# Patient Record
(demographics unavailable — no encounter records)

---

## 2025-01-24 NOTE — PLAN
[FreeTextEntry1] : PCB    - US ordered   Vaginal discharge   -Affirm culture obtained  and metrogel sent   F/U in 1 month for annual examination  All questions and concerns addressed during encounter. Pt. agreed to plan of care.

## 2025-01-24 NOTE — PHYSICAL EXAM
[Chaperone Present] : A chaperone was present in the examining room during all aspects of the physical examination [86789] : A chaperone was present during the pelvic exam. [Labia Majora] : normal [Labia Minora] : normal [IUD String] : an IUD string was noted [Normal] : normal [Uterine Adnexae] : normal

## 2025-01-24 NOTE — HISTORY OF PRESENT ILLNESS
NO F/U APPT SCHEDULED   [FreeTextEntry1] : 27 year old female presents with complaints of vaginal discharge and post coital bleeding.

## 2025-01-24 NOTE — REASON FOR VISIT
[Acute] : an acute visit for [FreeTextEntry2] : post coital bleeding and foul smelling vaginal odor.

## 2025-03-13 NOTE — HISTORY OF PRESENT ILLNESS
[FreeTextEntry1] : 27 year old female presents for sonogram results. Pt. desires removal of IUD and to be started on OCP.  No

## 2025-03-13 NOTE — PROCEDURE
[IUD Removal] : intrauterine device (IUD) removal [Time out performed] : Pre-procedure time out performed.  Patient's name, date of birth and procedure confirmed. [Consent Obtained] : Consent obtained [Risks] : risks [Benefits] : benefits [Alternatives] : alternatives [Patient] : patient [Speculum Placed] : speculum placed [Strings Visualized] : strings visualized [IUD Discarded] : IUD discarded [Tolerated Well] : Patient tolerated the procedure well [No Complications] : no complications [de-identified] : recurrent vaginitis/spotting

## 2025-03-13 NOTE — PLAN
[FreeTextEntry1] : Encounter to discuss results   - Sonogram results show normal uterus, normal uterine lining, IUD in satisfactory position. Small corpus luteum cyst. - Reassuring findings.   Encounter for IUD removal   - Tolerated procedure well   Counseling on contraception   - We discussed various methods of contraception. Pt. desires OCP. Risks,benefits and alternatives were discussed. Pt. verbalized understanding.   F/U in 6 months  All questions and concerns addressed during encounter. Pt. agreed to plan of care.

## 2025-04-18 NOTE — REASON FOR VISIT
[Symptom and Test Evaluation] : symptom and test evaluation [Arrhythmia/ECG Abnorrmalities] : arrhythmia/ECG abnormalities [FreeTextEntry3] : Dr. Santos

## 2025-04-18 NOTE — DISCUSSION/SUMMARY
[Patient] : the patient [With Me] : with me [___ Month(s)] : in [unfilled] month(s) [FreeTextEntry1] : 28F w/ PMH as above presenting for evaluation of non-exertional chest pains and palpitations.  1. Chest pains/palps - recent blood work per patient had a normal TSH (I will obtain the records).  1 week live Zio, TTE for structural heart eval and ETT to see heart rate response to exercise.  RTC 6 months, I will call with testing results.  [EKG obtained to assist in diagnosis and management of assessed problem(s)] : EKG obtained to assist in diagnosis and management of assessed problem(s)

## 2025-04-18 NOTE — HISTORY OF PRESENT ILLNESS
[FreeTextEntry1] : 28F w/ no significant PMH presenting for evaluation of palpitations and chest pains.  She has been reporting 4-5 months of seconds long episodes that are substernal, happen 2x per day and are not related to exertion.  She has a history of a heart murmur.  Originally from [facility] and without a childhood history of rheumatic fever.